# Patient Record
Sex: MALE | URBAN - METROPOLITAN AREA
[De-identification: names, ages, dates, MRNs, and addresses within clinical notes are randomized per-mention and may not be internally consistent; named-entity substitution may affect disease eponyms.]

---

## 2022-12-20 ENCOUNTER — HOSPITAL ENCOUNTER (EMERGENCY)
Facility: MEDICAL CENTER | Age: 57
End: 2022-12-20

## 2022-12-20 VITALS
WEIGHT: 178.57 LBS | HEART RATE: 119 BPM | OXYGEN SATURATION: 96 % | HEIGHT: 69 IN | SYSTOLIC BLOOD PRESSURE: 156 MMHG | BODY MASS INDEX: 26.45 KG/M2 | TEMPERATURE: 97.7 F | DIASTOLIC BLOOD PRESSURE: 102 MMHG | RESPIRATION RATE: 18 BRPM

## 2022-12-20 LAB — EKG IMPRESSION: NORMAL

## 2022-12-20 PROCEDURE — 93005 ELECTROCARDIOGRAM TRACING: CPT

## 2022-12-20 PROCEDURE — 302449 STATCHG TRIAGE ONLY (STATISTIC)

## 2022-12-21 NOTE — ED NOTES
Upon being told wait times, pt became verbally aggressive, and wanted to wait with his son in the ICU.  Told patient our policy was to wait in the ER until he could be seen by an ERP, and said he was just going to leave.

## 2022-12-21 NOTE — ED TRIAGE NOTES
"Chief Complaint   Patient presents with    Medication Refill     States lives in [place], came to the hospital to visit son who is in ICU, and forgot his medications.  Amlodipine 10mg, metformin 850mg and another cardiac medication pt is unsure of that is 25mg.    Dizziness       Pt ambulated to triage. Pt A&Ox4, came in for above complaint.  EKG completed prior to triage process.     Pt to lobby . Pt educated on alerting staff in changes to condition. Pt verbalized understanding.     BP (!) 156/102   Pulse (!) 119   Temp 36.5 °C (97.7 °F) (Temporal)   Resp 18   Ht 1.753 m (5' 9\")   Wt 81 kg (178 lb 9.2 oz)   SpO2 96% Comment: Room air  BMI 26.37 kg/m²     "

## 2022-12-21 NOTE — ED NOTES
Patient upset because he isn't being roomed right now. Patient wants to go to the ICU with family. Patient signed ama form